# Patient Record
Sex: MALE | Race: WHITE | NOT HISPANIC OR LATINO | URBAN - METROPOLITAN AREA
[De-identification: names, ages, dates, MRNs, and addresses within clinical notes are randomized per-mention and may not be internally consistent; named-entity substitution may affect disease eponyms.]

---

## 2021-06-11 ENCOUNTER — OFFICE (OUTPATIENT)
Dept: URBAN - METROPOLITAN AREA CLINIC 102 | Facility: CLINIC | Age: 76
End: 2021-06-11

## 2021-06-11 VITALS
TEMPERATURE: 98.1 F | DIASTOLIC BLOOD PRESSURE: 77 MMHG | HEART RATE: 61 BPM | SYSTOLIC BLOOD PRESSURE: 133 MMHG | WEIGHT: 177 LBS | HEIGHT: 67 IN

## 2021-06-11 DIAGNOSIS — K59.31 TOXIC MEGACOLON: ICD-10-CM

## 2021-06-11 PROCEDURE — 99204 OFFICE O/P NEW MOD 45 MIN: CPT

## 2021-06-11 NOTE — SERVICEHPINOTES
JIMMIE YUNG   is a   75   year old male who is being seen in consultation at the request of   SOL HOWE   to discuss colonoscopy. He states he was diagnosed with UC around 2005 with Dr Ragsdale. Had been managed on sulfasalazine, mesalamine throughout the years. [facility] and [facility] which worked well for him. His last colonoscopy was in 2013. He states he was told to return in 3 years.BR Has been off of meds since [id number]. Currently, he is doing well. He denies any blood in stools. He does report a hemorrhoid issues, some rectal irritation when wiping but not currently an issue. BMs 2-3x/day, BSS type 4-5 mainly. No abdominal pain or cramping. Some gas. No recent antibiotics. His uncle had colon cancer but otherwise no family history of GI issues. No cardiac issues. No NSAIDs.

## 2021-07-21 ENCOUNTER — OFFICE (OUTPATIENT)
Dept: URBAN - METROPOLITAN AREA CLINIC 102 | Facility: CLINIC | Age: 76
End: 2021-07-21

## 2021-07-21 PROCEDURE — 00031: CPT | Performed by: INTERNAL MEDICINE

## 2021-08-02 ENCOUNTER — ON CAMPUS - OUTPATIENT (OUTPATIENT)
Dept: URBAN - METROPOLITAN AREA HOSPITAL 37 | Facility: HOSPITAL | Age: 76
End: 2021-08-02
Payer: COMMERCIAL

## 2021-08-02 DIAGNOSIS — K51.90 ULCERATIVE COLITIS, UNSPECIFIED, WITHOUT COMPLICATIONS: ICD-10-CM

## 2021-08-02 DIAGNOSIS — K51.40 INFLAMMATORY POLYPS OF COLON WITHOUT COMPLICATIONS: ICD-10-CM

## 2021-08-02 PROCEDURE — 45380 COLONOSCOPY AND BIOPSY: CPT | Performed by: INTERNAL MEDICINE

## 2021-09-14 ENCOUNTER — OFFICE (OUTPATIENT)
Dept: URBAN - METROPOLITAN AREA CLINIC 102 | Facility: CLINIC | Age: 76
End: 2021-09-14

## 2021-09-14 VITALS
WEIGHT: 169 LBS | TEMPERATURE: 98.1 F | DIASTOLIC BLOOD PRESSURE: 65 MMHG | HEART RATE: 93 BPM | SYSTOLIC BLOOD PRESSURE: 121 MMHG | HEIGHT: 67 IN

## 2021-09-14 DIAGNOSIS — K59.31 TOXIC MEGACOLON: ICD-10-CM

## 2021-09-14 DIAGNOSIS — E11.9 TYPE 2 DIABETES MELLITUS WITHOUT COMPLICATIONS: ICD-10-CM

## 2021-09-14 PROCEDURE — 99214 OFFICE O/P EST MOD 30 MIN: CPT | Performed by: INTERNAL MEDICINE

## 2021-12-07 ENCOUNTER — OFFICE (OUTPATIENT)
Dept: URBAN - METROPOLITAN AREA CLINIC 102 | Facility: CLINIC | Age: 76
End: 2021-12-07

## 2021-12-07 VITALS
HEIGHT: 67 IN | WEIGHT: 168 LBS | HEART RATE: 80 BPM | DIASTOLIC BLOOD PRESSURE: 79 MMHG | SYSTOLIC BLOOD PRESSURE: 127 MMHG | TEMPERATURE: 98.8 F

## 2021-12-07 DIAGNOSIS — K59.31 TOXIC MEGACOLON: ICD-10-CM

## 2021-12-07 DIAGNOSIS — K51.80 OTHER ULCERATIVE COLITIS WITHOUT COMPLICATIONS: ICD-10-CM

## 2021-12-07 PROCEDURE — 99214 OFFICE O/P EST MOD 30 MIN: CPT | Performed by: INTERNAL MEDICINE
